# Patient Record
Sex: MALE | Race: WHITE | NOT HISPANIC OR LATINO
[De-identification: names, ages, dates, MRNs, and addresses within clinical notes are randomized per-mention and may not be internally consistent; named-entity substitution may affect disease eponyms.]

---

## 2021-11-22 PROBLEM — Z00.00 ENCOUNTER FOR PREVENTIVE HEALTH EXAMINATION: Status: ACTIVE | Noted: 2021-11-22

## 2021-11-23 ENCOUNTER — APPOINTMENT (OUTPATIENT)
Dept: OTOLARYNGOLOGY | Facility: CLINIC | Age: 71
End: 2021-11-23
Payer: MEDICARE

## 2021-11-23 PROCEDURE — 69210 REMOVE IMPACTED EAR WAX UNI: CPT

## 2021-11-23 PROCEDURE — 99204 OFFICE O/P NEW MOD 45 MIN: CPT

## 2021-11-23 RX ORDER — CIPROFLOXACIN AND DEXAMETHASONE 3; 1 MG/ML; MG/ML
0.3-0.1 SUSPENSION/ DROPS AURICULAR (OTIC) TWICE DAILY
Qty: 1 | Refills: 1 | Status: ACTIVE | COMMUNITY
Start: 2021-11-23 | End: 1900-01-01

## 2021-11-23 NOTE — HISTORY OF PRESENT ILLNESS
[de-identified] : 71M here for initial evaluation.\par \par He c/o left ear pain and diminished hearing for over the past 2 months. Initially, when sx started, there was much more pain and drainage - he went to ENT who placed ear wick and started him on otic drops. The wick fell out and on f/u visit the ENT wanted to replace the wick but pt refused. Since then, his sx have persisted, though the pain has gotten a bit better. He saw another ENT in [place] who told pt he had a very narrow ear canal.\par Sx still persisted and he is here in followup. There is still left ear pain and muffled hearing.\par Known hearing loss on audiogram years ago; was  around loud noise.\par \par ROS otherwise unremarkable.

## 2021-11-23 NOTE — CONSULT LETTER
[Dear  ___] : Dear  [unfilled], [Courtesy Letter:] : I had the pleasure of seeing your patient, [unfilled], in my office today. [Consult Closing:] : Thank you very much for allowing me to participate in the care of this patient.  If you have any questions, please do not hesitate to contact me. [Sincerely,] : Sincerely, [FreeTextEntry3] : Mac Chew MD\par Department of Otolaryngology - Head and Neck Surgery\par [facility]

## 2021-11-23 NOTE — PHYSICAL EXAM
[Midline] : trachea located in midline position [Normal] : no rashes [FreeTextEntry1] : Ad: EAC narrow w cerumen removed w curet. TM intact and mobile, ME clear\par As: EAC swollen completely shut - cannot advance peds speculum to see TM, moderate pain, minimal debris, cx taken and suctioned\par \par wick placed, dexamethasone placed into wick

## 2021-11-23 NOTE — ASSESSMENT
[FreeTextEntry1] : 71M here for initial evaluation. He c/o left ear pain and diminished hearing for over the past 2 months. Initially, when sx started, there was much more pain and drainage - he [place] who placed ear wick and started him on otic drops. The wick fell out and on f/u visit the ENT wanted to replace the wick but pt refused. Since then, his sx have persisted, though the pain has gotten a bit better. He saw another ENT in [place] who told pt he had a very narrow ear canal. He has been using triamcinolone and nystatin ointments to the canal. Sx still persisted and he is here in followup. There is still left ear pain and muffled hearing. He has known hearing loss on audiogram years ago; was  around loud noise.\par On exam, cerumen was cleaned from a narrow right EAC. The left EAC is swollen completely shut; I cannot advance even a peds speculum to see the TM; there is moderate pain and minimal debris, of which cx taken and suctioned. Wick was placed to stent the EAC open and dex applied to wick.\par He has persistent, incompletely treated left otitis externa, chronic at this point, with substantial circumferential thickening of the EAC walls with complete obstruction. I placed a wick in effort to reestablish EAC patency, but unsure if will stay open. To start ciprodex drops as well to wick and will f/u cx in the interim if drops should be switched. He will have wick removed over weekend in ED (it is thanksgiving weekend so office closed Th and Fr) and RTO next week for exam. Continue drops even after wick removal.

## 2021-11-29 LAB — EAR NOSE AND THROAT CULTURE: ABNORMAL

## 2021-12-14 ENCOUNTER — APPOINTMENT (OUTPATIENT)
Dept: OTOLARYNGOLOGY | Facility: CLINIC | Age: 71
End: 2021-12-14
Payer: MEDICARE

## 2021-12-14 DIAGNOSIS — H60.90 UNSPECIFIED OTITIS EXTERNA, UNSPECIFIED EAR: ICD-10-CM

## 2021-12-14 PROCEDURE — 99213 OFFICE O/P EST LOW 20 MIN: CPT

## 2021-12-14 NOTE — PHYSICAL EXAM
[Midline] : trachea located in midline position [Normal] : no rashes [FreeTextEntry1] : Ad: EAC narrow w cerumen, patent. TM intact and mobile, ME clear\par As: EAC walls very thick and completely shut - cannot advance peds speculum to see TM, moderate pain, minimal debris, suctioned, no purulence

## 2021-12-14 NOTE — CONSULT LETTER
[Dear  ___] : Dear  [unfilled], [Courtesy Letter:] : I had the pleasure of seeing your patient, [unfilled], in my office today. [Consult Closing:] : Thank you very much for allowing me to participate in the care of this patient.  If you have any questions, please do not hesitate to contact me. [Sincerely,] : Sincerely, [FreeTextEntry3] : Mac Chew MD\par Department of Otolaryngology - Head and Neck Surgery\par [facility]

## 2021-12-14 NOTE — HISTORY OF PRESENT ILLNESS
[de-identified] : 71M here in followup.\par \par Since last seen 3 weeks ago, he remains symptomatic.\par After prior visit he had the left ear wick removed in an ED but never followed up. Even after wick removal hearing never improved; he has continued with the ciprodex. There is no pain.\par \par Prior note 11/23/21:\par He c/o left ear pain and diminished hearing for over the past 2 months. Initially, when sx started, there was much more pain and drainage - he went to ENT who placed ear wick and started him on otic drops. The wick fell out and on f/u visit the ENT wanted to replace the wick but pt refused. Since then, his sx have persisted, though the pain has gotten a bit better. He saw another ENT in [place] who told pt he had a very narrow ear canal.\par Sx still persisted and he is here in followup. There is still left ear pain and muffled hearing.\par Known hearing loss on audiogram years ago; was  around loud noise.\par \par ROS otherwise unremarkable.

## 2021-12-14 NOTE — ASSESSMENT
[FreeTextEntry1] : 71M here in followup. Since last seen 3 weeks ago, he remains symptomatic. Three days after prior visit he had the left ear wick removed in an ED but never followed up thereafter. Even after wick removal the left sided hearing never improved; he has continued with the ciprodex. There is no pain.\par Prior to initial visit 11/23/21, he c/o left ear pain and diminished hearing for over 2 months. Initially, when sx started, there was much more pain and drainage - [place] who placed ear wick and started him on otic drops. The wick fell out and on f/u visit the ENT wanted to replace the wick but pt refused. Since then, his sx have persisted, though the pain has gotten a bit better. He saw another ENT in [place] who told pt he had a very narrow ear canal. He has been using triamcinolone and nystatin ointments to the canal. Sx still persisted and he came here 11/23/21 and was noted to have completely obstructed left EAC due to thickened and scarred walls; at that time, wick placed as above.\par He has known hearing loss on audiogram years ago; was  around loud noise.\par On exam, the left EAC is swollen completely shut; I cannot advance even a peds speculum to see the TM; there is mild pain and minimal debris, of which was suctioned. \par He has complete stenosis of the left EAC from severe circumferential thickening of the EAC walls with complete obstruction. This is all likely secondary to incompletely treated otitis externa present even prior to his first visit here with me. I will send to one of my otology colleagues for further evaluation and treatment management for this chronic issue.

## 2021-12-29 ENCOUNTER — APPOINTMENT (OUTPATIENT)
Dept: OTOLARYNGOLOGY | Facility: CLINIC | Age: 71
End: 2021-12-29
Payer: MEDICARE

## 2021-12-29 VITALS
OXYGEN SATURATION: 97 % | HEIGHT: 62 IN | SYSTOLIC BLOOD PRESSURE: 134 MMHG | BODY MASS INDEX: 32.2 KG/M2 | HEART RATE: 74 BPM | DIASTOLIC BLOOD PRESSURE: 78 MMHG | WEIGHT: 175 LBS | TEMPERATURE: 98.4 F

## 2021-12-29 DIAGNOSIS — Z78.9 OTHER SPECIFIED HEALTH STATUS: ICD-10-CM

## 2021-12-29 DIAGNOSIS — Z72.89 OTHER PROBLEMS RELATED TO LIFESTYLE: ICD-10-CM

## 2021-12-29 DIAGNOSIS — H93.90 UNSPECIFIED DISORDER OF EAR, UNSPECIFIED EAR: ICD-10-CM

## 2021-12-29 DIAGNOSIS — Z82.3 FAMILY HISTORY OF STROKE: ICD-10-CM

## 2021-12-29 DIAGNOSIS — Z85.46 PERSONAL HISTORY OF MALIGNANT NEOPLASM OF PROSTATE: ICD-10-CM

## 2021-12-29 DIAGNOSIS — Z82.2 FAMILY HISTORY OF DEAFNESS AND HEARING LOSS: ICD-10-CM

## 2021-12-29 PROCEDURE — 99214 OFFICE O/P EST MOD 30 MIN: CPT

## 2021-12-29 PROCEDURE — 92557 COMPREHENSIVE HEARING TEST: CPT

## 2021-12-29 PROCEDURE — 92550 TYMPANOMETRY & REFLEX THRESH: CPT

## 2021-12-29 RX ORDER — SULFAMETHOXAZOLE AND TRIMETHOPRIM 800; 160 MG/1; MG/1
800-160 TABLET ORAL
Qty: 20 | Refills: 0 | Status: ACTIVE | COMMUNITY
Start: 2021-12-29 | End: 1900-01-01

## 2021-12-29 RX ORDER — THIAMINE HCL 100 MG
500 TABLET ORAL
Refills: 0 | Status: ACTIVE | COMMUNITY

## 2021-12-29 NOTE — ASSESSMENT
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 \par rtc with ct - he may be a surgical candidate

## 2021-12-29 NOTE — HISTORY OF PRESENT ILLNESS
[de-identified] : 72 yo M with left progressive hl and feels he has had no l hearing for 4-5 months. He has drainage from the ear, especially when he opens and closes his mouth and dull discomfort. He has tried Otowick and drops approx 4 x and does not want to do this again as they have never helped. He is here with his wife. About to take a flight out of town. 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 Stopped using qtips a few months ago. Not diabetic.

## 2021-12-29 NOTE — PHYSICAL EXAM
[Normal] : no neck adenopathy [de-identified] : b canals stenotic. r tm normal; l canal sealed shut. Attempted to open under microscope with pediatric speculum and nasal speculum without success. drainage cultures and suctioned. tm not visualized. [de-identified] : gait steady

## 2022-01-05 LAB — EAR NOSE AND THROAT CULTURE: NORMAL

## 2022-02-03 ENCOUNTER — APPOINTMENT (OUTPATIENT)
Dept: OTOLARYNGOLOGY | Facility: CLINIC | Age: 72
End: 2022-02-03
Payer: MEDICARE

## 2022-02-03 VITALS — TEMPERATURE: 98.2 F

## 2022-02-03 DIAGNOSIS — H90.A32 MIXED CONDUCTIVE AND SENSORINEURAL HEARING, UNILATERAL, LEFT EAR WITH RESTRICTED HEARING ON THE  CONTRALATERAL SIDE: ICD-10-CM

## 2022-02-03 DIAGNOSIS — H92.02 OTALGIA, LEFT EAR: ICD-10-CM

## 2022-02-03 PROCEDURE — 99214 OFFICE O/P EST MOD 30 MIN: CPT

## 2022-02-03 NOTE — DATA REVIEWED
[de-identified] :  from last visit reviewed with pt and wife [de-identified] : ct l middle ear and mastoid opacification, likely cholesteatoma, eac nearly sealed shut. There appears to be erosion of anterior wall of eac. reviewed with -pt and wife

## 2022-02-03 NOTE — HISTORY OF PRESENT ILLNESS
[de-identified] : followup 70 yo M with 10 yr h/o l hl and drainage now with otalgia for 4-5 months. he has had drops and abx without improvement and 2 negative cultures. Here to review ct done today. here with his wife. He feels his hearing is completely blocked and pain improved after taking abx but still feels fullness. Pain with opening and closing mouth has improved. Denies trauma.

## 2022-02-03 NOTE — PHYSICAL EXAM
[Normal] : assessment of respiratory effort is normal [de-identified] : r nl; l nearly shut, some contiued putulence examined under microscope [FreeTextEntry2] : VII intact [de-identified] : gait steady

## 2022-02-03 NOTE — ASSESSMENT
[FreeTextEntry1] : l infected cholesteatoma vs all infx\par tmj may be involved - asked to see Dr Jacinto Cosby to see if she wishes to participate with surgery\par risks benefits and alts to l modified radical mastoidectomy discussed - will also need medical clearance.

## 2022-03-01 ENCOUNTER — NON-APPOINTMENT (OUTPATIENT)
Age: 72
End: 2022-03-01

## 2022-03-08 ENCOUNTER — NON-APPOINTMENT (OUTPATIENT)
Age: 72
End: 2022-03-08

## 2022-03-13 LAB — SARS-COV-2 N GENE NPH QL NAA+PROBE: NOT DETECTED

## 2022-03-13 RX ORDER — HYDROCODONE BITARTRATE AND ACETAMINOPHEN 5; 325 MG/1; MG/1
5-325 TABLET ORAL
Qty: 15 | Refills: 0 | Status: ACTIVE | COMMUNITY
Start: 2022-03-13 | End: 1900-01-01

## 2022-03-13 RX ORDER — CEFUROXIME AXETIL 500 MG/1
500 TABLET ORAL
Qty: 14 | Refills: 0 | Status: ACTIVE | COMMUNITY
Start: 2022-03-13 | End: 1900-01-01

## 2022-03-13 RX ORDER — DOCUSATE SODIUM 100 MG/1
100 CAPSULE, LIQUID FILLED ORAL TWICE DAILY
Qty: 42 | Refills: 0 | Status: ACTIVE | COMMUNITY
Start: 2022-03-13 | End: 1900-01-01

## 2022-03-15 ENCOUNTER — NON-APPOINTMENT (OUTPATIENT)
Age: 72
End: 2022-03-15

## 2022-03-18 ENCOUNTER — NON-APPOINTMENT (OUTPATIENT)
Age: 72
End: 2022-03-18

## 2022-03-23 ENCOUNTER — OUTPATIENT (OUTPATIENT)
Dept: OUTPATIENT SERVICES | Facility: HOSPITAL | Age: 72
LOS: 1 days | End: 2022-03-23

## 2022-03-23 ENCOUNTER — APPOINTMENT (OUTPATIENT)
Dept: RADIOLOGY | Facility: CLINIC | Age: 72
End: 2022-03-23
Payer: MEDICARE

## 2022-03-23 DIAGNOSIS — Z98.890 OTHER SPECIFIED POSTPROCEDURAL STATES: Chronic | ICD-10-CM

## 2022-03-23 DIAGNOSIS — Z90.89 ACQUIRED ABSENCE OF OTHER ORGANS: Chronic | ICD-10-CM

## 2022-03-23 DIAGNOSIS — Z87.19 PERSONAL HISTORY OF OTHER DISEASES OF THE DIGESTIVE SYSTEM: Chronic | ICD-10-CM

## 2022-03-23 PROBLEM — C61 MALIGNANT NEOPLASM OF PROSTATE: Chronic | Status: ACTIVE | Noted: 2022-03-15

## 2022-03-23 PROBLEM — I10 ESSENTIAL (PRIMARY) HYPERTENSION: Chronic | Status: ACTIVE | Noted: 2022-03-15

## 2022-03-23 PROCEDURE — 72100 X-RAY EXAM L-S SPINE 2/3 VWS: CPT | Mod: 26

## 2022-03-28 ENCOUNTER — NON-APPOINTMENT (OUTPATIENT)
Age: 72
End: 2022-03-28

## 2022-04-05 ENCOUNTER — NON-APPOINTMENT (OUTPATIENT)
Age: 72
End: 2022-04-05

## 2022-04-06 ENCOUNTER — NON-APPOINTMENT (OUTPATIENT)
Age: 72
End: 2022-04-06

## 2022-04-12 ENCOUNTER — NON-APPOINTMENT (OUTPATIENT)
Age: 72
End: 2022-04-12

## 2022-04-14 ENCOUNTER — NON-APPOINTMENT (OUTPATIENT)
Age: 72
End: 2022-04-14

## 2022-04-15 ENCOUNTER — NON-APPOINTMENT (OUTPATIENT)
Age: 72
End: 2022-04-15

## 2022-04-18 ENCOUNTER — TRANSCRIPTION ENCOUNTER (OUTPATIENT)
Age: 72
End: 2022-04-18

## 2022-04-18 ENCOUNTER — APPOINTMENT (OUTPATIENT)
Dept: OTOLARYNGOLOGY | Facility: CLINIC | Age: 72
End: 2022-04-18
Payer: MEDICARE

## 2022-04-18 ENCOUNTER — NON-APPOINTMENT (OUTPATIENT)
Age: 72
End: 2022-04-18

## 2022-04-18 VITALS
DIASTOLIC BLOOD PRESSURE: 83 MMHG | HEIGHT: 63 IN | WEIGHT: 175 LBS | BODY MASS INDEX: 31.01 KG/M2 | SYSTOLIC BLOOD PRESSURE: 159 MMHG | OXYGEN SATURATION: 99 % | TEMPERATURE: 98 F | HEART RATE: 83 BPM

## 2022-04-18 DIAGNOSIS — H70.12 CHRONIC MASTOIDITIS, LEFT EAR: ICD-10-CM

## 2022-04-18 LAB — SARS-COV-2 N GENE NPH QL NAA+PROBE: NOT DETECTED

## 2022-04-18 PROCEDURE — 99214 OFFICE O/P EST MOD 30 MIN: CPT

## 2022-04-18 NOTE — HISTORY OF PRESENT ILLNESS
[de-identified] : 2.5 month followup for this 70 y/o M here with wife with 10 yr h/o L hl and drainage. He is scheduled for L tympanoplasty with modified radical mastoidectomy tomorrow on 4/19. He denies pain or drainage. He had noted his sx improved with no drainage at last visit and I wished to recheck his ear.

## 2022-04-18 NOTE — ASU PATIENT PROFILE, ADULT - NSICDXPASTMEDICALHX_GEN_ALL_CORE_FT
PAST MEDICAL HISTORY:  Hypertension     Prostate cancer      PAST MEDICAL HISTORY:  H/O mastoiditis     Hypertension     Prostate cancer

## 2022-04-18 NOTE — BRIEF OPERATIVE NOTE - NSICDXBRIEFPOSTOP_GEN_ALL_CORE_FT
POST-OP DIAGNOSIS:  Cholesteatoma, left 18-Apr-2022 10:05:39  Odilon Steele  Chronic left mastoiditis 18-Apr-2022 10:05:48  Odilon Steele  Mixed conductive and sensorineural hearing loss of left ear with restricted hearing of right ear 18-Apr-2022 10:06:01  Odilon Steele   POST-OP DIAGNOSIS:  Chronic left mastoiditis 18-Apr-2022 10:05:48  Odilon Steele  Fistula of left mastoid 19-Apr-2022 15:32:43  Odilon Steele  Conductive hearing loss in left ear 19-Apr-2022 15:33:08  Odilon Steele   POST-OP DIAGNOSIS:  Chronic left mastoiditis 18-Apr-2022 10:05:48  Odilon Steele  Fistula of left mastoid 19-Apr-2022 15:32:43  Odilon Steele  Conductive hearing loss in left ear 19-Apr-2022 15:33:08  Odilon Steele  Stenosis of external ear canal 19-Apr-2022 16:50:23  Odilon Steele

## 2022-04-18 NOTE — BRIEF OPERATIVE NOTE - NSICDXBRIEFPREOP_GEN_ALL_CORE_FT
PRE-OP DIAGNOSIS:  Cholesteatoma, left 18-Apr-2022 10:04:51  Odilon Steele  Chronic left mastoiditis 18-Apr-2022 10:05:03  Odilon Steele  Mixed conductive and sensorineural hearing loss of left ear with restricted hearing of right ear 18-Apr-2022 10:05:27  Odilon Steele   PRE-OP DIAGNOSIS:  Chronic left mastoiditis 18-Apr-2022 10:05:03  Odilon Steele  Fistula of left mastoid 19-Apr-2022 15:30:54  Odilon Steele  Conductive hearing loss in left ear 19-Apr-2022 15:31:14  Odilon Steele   PRE-OP DIAGNOSIS:  Chronic left mastoiditis 18-Apr-2022 10:05:03  Odilon Steele  Fistula of left mastoid 19-Apr-2022 15:30:54  Odilon Steele  Conductive hearing loss in left ear 19-Apr-2022 15:31:14  Odilon Steele  Stenosis of external ear canal 19-Apr-2022 16:49:47  Odilon Steele

## 2022-04-18 NOTE — BRIEF OPERATIVE NOTE - NSICDXBRIEFPROCEDURE_GEN_ALL_CORE_FT
PROCEDURES:  Left tympanoplasty with mastoidectomy 18-Apr-2022 10:04:33  Odilon Steele   PROCEDURES:  Modified radical mastoidectomy 19-Apr-2022 15:28:01  Odilon Steele  Meatoplasty, external auditory canal 19-Apr-2022 15:29:24  Odilon Steele

## 2022-04-18 NOTE — DATA REVIEWED
[de-identified] :  today severe l chl but improved over last visit 20 dB in lf's [de-identified] : ct reviewed with pt - l mastoid fistula into superior eac, opacified mastoid and ME, tmj posterior wall possible erosion, stenotic eac

## 2022-04-18 NOTE — ASU DISCHARGE PLAN (ADULT/PEDIATRIC) - CARE PROVIDER_API CALL
Vishal Villaseñor)  Otolaryngology  [address], [address]  [place], [place] [address]  Phone: [phone]  Fax: [phone]  Follow Up Time:

## 2022-04-18 NOTE — ASU DISCHARGE PLAN (ADULT/PEDIATRIC) - PROCEDURE
Left tympanoplasty with modified radical mastoidectomy Left modified radical mastoidectomy with meatoplasty Left modified radical mastoidectomy and Left meatoplasty

## 2022-04-18 NOTE — ASSESSMENT
[FreeTextEntry1] : L cholesteatoma with stenosed eac\par -no evidence of drainage\par -cerumen/ debris suctioned atraumatically\par -ear felt better\par -scheduled for L modified radical mastoidectomy tomorrow - ct reviewed with patient and his wife. Does not appear currently infected but has mastoid-eac fistula and chl\par I discussed technique, risks, benefits and alts to surgery with pt and his wife\par they wished to proceed\par consent signed\par he said she still has his postop meds from when surgery was scheduled last

## 2022-04-18 NOTE — BRIEF OPERATIVE NOTE - OPERATION/FINDINGS
Left mastoid Left mastoid filled with granulation tissue, middle ear filled with granulation tissue, two fistulae from external auditory canal into mastoid and ossicular chain intact

## 2022-04-18 NOTE — ASU PATIENT PROFILE, ADULT - NSICDXPASTSURGICALHX_GEN_ALL_CORE_FT
PAST SURGICAL HISTORY:  History of prostate biopsy     History of prostate surgery Transperineal       Prostate Cryoablation    History of rectal abscess     Status post tonsillectomy     Status post tonsillectomy

## 2022-04-18 NOTE — PHYSICAL EXAM
[Normal] : external ears are normal bilaterally [de-identified] : R ok, L stenotic eac, copious cerumen/ debris removed atraumatically with suction under microscope, TMs intact (as much of l which could be seen) [de-identified] : gait steady

## 2022-04-18 NOTE — ASU PATIENT PROFILE, ADULT - FALL HARM RISK - UNIVERSAL INTERVENTIONS
Bed in lowest position, wheels locked, appropriate side rails in place/Call bell, personal items and telephone in reach/Instruct patient to call for assistance before getting out of bed or chair/Non-slip footwear when patient is out of bed/[place] to call system/Physically safe environment - no spills, clutter or unnecessary equipment/Purposeful Proactive Rounding/Room/bathroom lighting operational, light cord in reach

## 2022-04-18 NOTE — PROCEDURE
[Cerumen Impaction] : Cerumen Impaction [Same] : same as the Pre Op Dx. [] : Removal of Cerumen [FreeTextEntry5] : R nl, L stenotic eac, copious cerumen/debris removed atraumatically under microscope, TMs intact

## 2022-04-19 ENCOUNTER — RESULT REVIEW (OUTPATIENT)
Age: 72
End: 2022-04-19

## 2022-04-19 ENCOUNTER — EMERGENCY (EMERGENCY)
Facility: HOSPITAL | Age: 72
LOS: 1 days | Discharge: HOME | End: 2022-04-19
Attending: EMERGENCY MEDICINE | Admitting: EMERGENCY MEDICINE
Payer: MEDICARE

## 2022-04-19 ENCOUNTER — NON-APPOINTMENT (OUTPATIENT)
Age: 72
End: 2022-04-19

## 2022-04-19 ENCOUNTER — APPOINTMENT (OUTPATIENT)
Dept: OTOLARYNGOLOGY | Facility: AMBULATORY SURGERY CENTER | Age: 72
End: 2022-04-19

## 2022-04-19 ENCOUNTER — OUTPATIENT (OUTPATIENT)
Dept: OUTPATIENT SERVICES | Facility: HOSPITAL | Age: 72
LOS: 1 days | Discharge: HOME | End: 2022-04-19
Payer: MEDICARE

## 2022-04-19 VITALS
HEIGHT: 63 IN | OXYGEN SATURATION: 96 % | TEMPERATURE: 98 F | HEART RATE: 93 BPM | DIASTOLIC BLOOD PRESSURE: 67 MMHG | RESPIRATION RATE: 18 BRPM | SYSTOLIC BLOOD PRESSURE: 112 MMHG

## 2022-04-19 VITALS
SYSTOLIC BLOOD PRESSURE: 140 MMHG | DIASTOLIC BLOOD PRESSURE: 71 MMHG | OXYGEN SATURATION: 94 % | TEMPERATURE: 97 F | HEART RATE: 99 BPM

## 2022-04-19 VITALS
WEIGHT: 187.39 LBS | DIASTOLIC BLOOD PRESSURE: 64 MMHG | TEMPERATURE: 99 F | HEART RATE: 72 BPM | OXYGEN SATURATION: 97 % | SYSTOLIC BLOOD PRESSURE: 137 MMHG | RESPIRATION RATE: 16 BRPM | HEIGHT: 63 IN

## 2022-04-19 DIAGNOSIS — Z88.8 ALLERGY STATUS TO OTHER DRUGS, MEDICAMENTS AND BIOLOGICAL SUBSTANCES: ICD-10-CM

## 2022-04-19 DIAGNOSIS — Z90.89 ACQUIRED ABSENCE OF OTHER ORGANS: Chronic | ICD-10-CM

## 2022-04-19 DIAGNOSIS — Z98.890 OTHER SPECIFIED POSTPROCEDURAL STATES: Chronic | ICD-10-CM

## 2022-04-19 DIAGNOSIS — Z85.46 PERSONAL HISTORY OF MALIGNANT NEOPLASM OF PROSTATE: ICD-10-CM

## 2022-04-19 DIAGNOSIS — Z86.69 PERSONAL HISTORY OF OTHER DISEASES OF THE NERVOUS SYSTEM AND SENSE ORGANS: ICD-10-CM

## 2022-04-19 DIAGNOSIS — M25.561 PAIN IN RIGHT KNEE: ICD-10-CM

## 2022-04-19 DIAGNOSIS — Z87.19 PERSONAL HISTORY OF OTHER DISEASES OF THE DIGESTIVE SYSTEM: Chronic | ICD-10-CM

## 2022-04-19 DIAGNOSIS — M54.9 DORSALGIA, UNSPECIFIED: ICD-10-CM

## 2022-04-19 DIAGNOSIS — M79.604 PAIN IN RIGHT LEG: ICD-10-CM

## 2022-04-19 DIAGNOSIS — R25.1 TREMOR, UNSPECIFIED: ICD-10-CM

## 2022-04-19 DIAGNOSIS — I10 ESSENTIAL (PRIMARY) HYPERTENSION: ICD-10-CM

## 2022-04-19 DIAGNOSIS — Z90.89 ACQUIRED ABSENCE OF OTHER ORGANS: ICD-10-CM

## 2022-04-19 DIAGNOSIS — R53.1 WEAKNESS: ICD-10-CM

## 2022-04-19 LAB
GLUCOSE BLDC GLUCOMTR-MCNC: 146 MG/DL — HIGH (ref 70–99)
GRAM STN FLD: SIGNIFICANT CHANGE UP
SPECIMEN SOURCE: SIGNIFICANT CHANGE UP

## 2022-04-19 PROCEDURE — 99284 EMERGENCY DEPT VISIT MOD MDM: CPT

## 2022-04-19 PROCEDURE — 69310 REBUILD OUTER EAR CANAL: CPT | Mod: LT,59

## 2022-04-19 PROCEDURE — 88305 TISSUE EXAM BY PATHOLOGIST: CPT | Mod: 26

## 2022-04-19 PROCEDURE — 69645 REVISE MIDDLE EAR & MASTOID: CPT | Mod: LT

## 2022-04-19 DEVICE — SURGIFOAM PAD 2CM X 6CM X 7MM (12-7): Type: IMPLANTABLE DEVICE | Status: FUNCTIONAL

## 2022-04-19 RX ORDER — ACETAMINOPHEN 500 MG
975 TABLET ORAL ONCE
Refills: 0 | Status: COMPLETED | OUTPATIENT
Start: 2022-04-19 | End: 2022-04-19

## 2022-04-19 RX ORDER — CEFUROXIME AXETIL 250 MG
1 TABLET ORAL
Qty: 0 | Refills: 0 | DISCHARGE

## 2022-04-19 RX ORDER — SODIUM CHLORIDE 9 MG/ML
1000 INJECTION, SOLUTION INTRAVENOUS ONCE
Refills: 0 | Status: COMPLETED | OUTPATIENT
Start: 2022-04-19 | End: 2022-04-19

## 2022-04-19 RX ORDER — DOCUSATE SODIUM 100 MG
1 CAPSULE ORAL
Qty: 0 | Refills: 0 | DISCHARGE

## 2022-04-19 RX ORDER — SODIUM CHLORIDE 9 MG/ML
1000 INJECTION, SOLUTION INTRAVENOUS
Refills: 0 | Status: DISCONTINUED | OUTPATIENT
Start: 2022-04-19 | End: 2022-04-19

## 2022-04-19 RX ADMIN — SODIUM CHLORIDE 100 MILLILITER(S): 9 INJECTION, SOLUTION INTRAVENOUS at 20:28

## 2022-04-19 RX ADMIN — SODIUM CHLORIDE 1000 MILLILITER(S): 9 INJECTION, SOLUTION INTRAVENOUS at 20:28

## 2022-04-19 RX ADMIN — Medication 975 MILLIGRAM(S): at 19:48

## 2022-04-19 NOTE — ED ADULT NURSE NOTE - OBJECTIVE STATEMENT
pt s/p mastoidectomy this morning, was unable to ambulate after the procedure. pt was transferred from [facility]. mild bleeding noted to the L ear. pt c/o pain to the R knee, and unable to open his L eye. Gauze dressing around the incision site on the face. denies any complications from sx besides being unable to ambulate. AOx4, able to speak in full sentences and can follow commands, decreased hearing noted on the L side.

## 2022-04-19 NOTE — ED ADULT NURSE NOTE - NSIMPLEMENTINTERV_GEN_ALL_ED
Implemented All Universal Safety Interventions:  [place] to call system. Call bell, personal items and telephone within reach. Instruct patient to call for assistance. Room bathroom lighting operational. Non-slip footwear when patient is off stretcher. Physically safe environment: no spills, clutter or unnecessary equipment. Stretcher in lowest position, wheels locked, appropriate side rails in place.

## 2022-04-20 ENCOUNTER — NON-APPOINTMENT (OUTPATIENT)
Age: 72
End: 2022-04-20

## 2022-04-20 VITALS
HEART RATE: 93 BPM | OXYGEN SATURATION: 98 % | SYSTOLIC BLOOD PRESSURE: 142 MMHG | DIASTOLIC BLOOD PRESSURE: 67 MMHG | RESPIRATION RATE: 16 BRPM | TEMPERATURE: 98 F

## 2022-04-20 PROBLEM — Z86.69 PERSONAL HISTORY OF OTHER DISEASES OF THE NERVOUS SYSTEM AND SENSE ORGANS: Chronic | Status: ACTIVE | Noted: 2022-04-19

## 2022-04-20 LAB
ALBUMIN SERPL ELPH-MCNC: 4.1 G/DL — SIGNIFICANT CHANGE UP (ref 3.3–5)
ALP SERPL-CCNC: 58 U/L — SIGNIFICANT CHANGE UP (ref 40–120)
ALT FLD-CCNC: 16 U/L — SIGNIFICANT CHANGE UP (ref 10–45)
ANION GAP SERPL CALC-SCNC: 13 MMOL/L — SIGNIFICANT CHANGE UP (ref 5–17)
APTT BLD: 28 SEC — SIGNIFICANT CHANGE UP (ref 27.5–35.5)
AST SERPL-CCNC: 20 U/L — SIGNIFICANT CHANGE UP (ref 10–40)
BASOPHILS # BLD AUTO: 0 K/UL — SIGNIFICANT CHANGE UP (ref 0–0.2)
BASOPHILS NFR BLD AUTO: 0 % — SIGNIFICANT CHANGE UP (ref 0–2)
BILIRUB SERPL-MCNC: 0.3 MG/DL — SIGNIFICANT CHANGE UP (ref 0.2–1.2)
BUN SERPL-MCNC: 14 MG/DL — SIGNIFICANT CHANGE UP (ref 7–23)
CALCIUM SERPL-MCNC: 8.8 MG/DL — SIGNIFICANT CHANGE UP (ref 8.4–10.5)
CHLORIDE SERPL-SCNC: 104 MMOL/L — SIGNIFICANT CHANGE UP (ref 96–108)
CO2 SERPL-SCNC: 21 MMOL/L — LOW (ref 22–31)
CREAT SERPL-MCNC: 0.86 MG/DL — SIGNIFICANT CHANGE UP (ref 0.5–1.3)
EGFR: 91 ML/MIN/1.73M2 — SIGNIFICANT CHANGE UP
EOSINOPHIL # BLD AUTO: 0 K/UL — SIGNIFICANT CHANGE UP (ref 0–0.5)
EOSINOPHIL NFR BLD AUTO: 0 % — SIGNIFICANT CHANGE UP (ref 0–6)
GLUCOSE SERPL-MCNC: 159 MG/DL — HIGH (ref 70–99)
HCT VFR BLD CALC: 41.9 % — SIGNIFICANT CHANGE UP (ref 39–50)
HGB BLD-MCNC: 14.4 G/DL — SIGNIFICANT CHANGE UP (ref 13–17)
IMM GRANULOCYTES NFR BLD AUTO: 0.4 % — SIGNIFICANT CHANGE UP (ref 0–1.5)
INR BLD: 1.03 — SIGNIFICANT CHANGE UP (ref 0.88–1.16)
LYMPHOCYTES # BLD AUTO: 0.85 K/UL — LOW (ref 1–3.3)
LYMPHOCYTES # BLD AUTO: 8.5 % — LOW (ref 13–44)
MCHC RBC-ENTMCNC: 33.4 PG — SIGNIFICANT CHANGE UP (ref 27–34)
MCHC RBC-ENTMCNC: 34.4 GM/DL — SIGNIFICANT CHANGE UP (ref 32–36)
MCV RBC AUTO: 97.2 FL — SIGNIFICANT CHANGE UP (ref 80–100)
MONOCYTES # BLD AUTO: 0.27 K/UL — SIGNIFICANT CHANGE UP (ref 0–0.9)
MONOCYTES NFR BLD AUTO: 2.7 % — SIGNIFICANT CHANGE UP (ref 2–14)
NEUTROPHILS # BLD AUTO: 8.81 K/UL — HIGH (ref 1.8–7.4)
NEUTROPHILS NFR BLD AUTO: 88.4 % — HIGH (ref 43–77)
NRBC # BLD: 0 /100 WBCS — SIGNIFICANT CHANGE UP (ref 0–0)
PLATELET # BLD AUTO: 199 K/UL — SIGNIFICANT CHANGE UP (ref 150–400)
POTASSIUM SERPL-MCNC: 4.1 MMOL/L — SIGNIFICANT CHANGE UP (ref 3.5–5.3)
POTASSIUM SERPL-SCNC: 4.1 MMOL/L — SIGNIFICANT CHANGE UP (ref 3.5–5.3)
PROT SERPL-MCNC: 6.7 G/DL — SIGNIFICANT CHANGE UP (ref 6–8.3)
PROTHROM AB SERPL-ACNC: 12.3 SEC — SIGNIFICANT CHANGE UP (ref 10.5–13.4)
RBC # BLD: 4.31 M/UL — SIGNIFICANT CHANGE UP (ref 4.2–5.8)
RBC # FLD: 13.6 % — SIGNIFICANT CHANGE UP (ref 10.3–14.5)
SODIUM SERPL-SCNC: 138 MMOL/L — SIGNIFICANT CHANGE UP (ref 135–145)
WBC # BLD: 9.97 K/UL — SIGNIFICANT CHANGE UP (ref 3.8–10.5)
WBC # FLD AUTO: 9.97 K/UL — SIGNIFICANT CHANGE UP (ref 3.8–10.5)

## 2022-04-20 PROCEDURE — 85025 COMPLETE CBC W/AUTO DIFF WBC: CPT

## 2022-04-20 PROCEDURE — 85730 THROMBOPLASTIN TIME PARTIAL: CPT

## 2022-04-20 PROCEDURE — 85610 PROTHROMBIN TIME: CPT

## 2022-04-20 PROCEDURE — 93971 EXTREMITY STUDY: CPT

## 2022-04-20 PROCEDURE — 93971 EXTREMITY STUDY: CPT | Mod: 26,RT

## 2022-04-20 PROCEDURE — 96374 THER/PROPH/DIAG INJ IV PUSH: CPT

## 2022-04-20 PROCEDURE — 80053 COMPREHEN METABOLIC PANEL: CPT

## 2022-04-20 PROCEDURE — 36415 COLL VENOUS BLD VENIPUNCTURE: CPT

## 2022-04-20 PROCEDURE — 99284 EMERGENCY DEPT VISIT MOD MDM: CPT | Mod: 25

## 2022-04-20 RX ORDER — ACETAMINOPHEN 500 MG
1000 TABLET ORAL ONCE
Refills: 0 | Status: COMPLETED | OUTPATIENT
Start: 2022-04-20 | End: 2022-04-20

## 2022-04-20 RX ORDER — OXYCODONE AND ACETAMINOPHEN 5; 325 MG/1; MG/1
1 TABLET ORAL ONCE
Refills: 0 | Status: DISCONTINUED | OUTPATIENT
Start: 2022-04-20 | End: 2022-04-20

## 2022-04-20 RX ADMIN — Medication 400 MILLIGRAM(S): at 01:04

## 2022-04-20 RX ADMIN — OXYCODONE AND ACETAMINOPHEN 1 TABLET(S): 5; 325 TABLET ORAL at 03:04

## 2022-04-20 NOTE — ED PROVIDER NOTE - CLINICAL SUMMARY MEDICAL DECISION MAKING FREE TEXT BOX
72 y/o M pt presents to ED with R knee and leg pain s/p surgery today. Plan for labs, LE dopplers, lumbar XR done, pt given IV Tylenol, and reassess. 70 y/o M pt presents to ED with R knee and leg pain s/p surgery today. Plan for labs, LE dopplers, lumbar XR done, pt given IV Tylenol, and reassess.  ENT to evaluate pt.

## 2022-04-20 NOTE — PROGRESS NOTE ADULT - ASSESSMENT
Assessment and Plan:  SANDRA AYALA is a 71M who is presenting from [facility] after outpatient surgery for chronic ear infection in which he underwent a canal wall down mastoidectomy and meatoplasty on the LEFT side. Patient in the postoperative period had difficulty walking because of R knee pain and R leg weakness. Per wife who was bedside, patient needed 3 people to stand him up at bedside because of the leg pain/weakness. Since transport and arrival to the ED, patient said he feels 70% better with pain and weakness. He said the R leg pain has mostly subsided and he feels much stronger in the leg as well. Doppler negative. At bedside, patient demonstrated improvement in ambulation, he was able to stand and walk around with out assistance. Patient was seen and examined with Dr. Villaseñor.     Plan:   - Patient demonstrated the ability to walk independently, cleared for discharge from ENT stand-point  - If patient is able to confidently stand, he can shower with a cotton  ball in his ear  - Change cotton ball in left ear as needed  - Patient can remove head wrap tomorrow   - Recommend to wear masks that tie ( do not wrap looped masks around ear)   - recommend for patient to follow up outpatient with his Neurologist   - Patient will follow up with Dr. Villaseñor next Monday at 2pm and tomorrow if needed.   - No NSAIDS for pain admitted    [facility] at [phone] with any questions/concerns.    Aileen Gastelum PA-C  04-20-22 @ 08:16   Assessment and Plan:  SANDRA AYALA is a 71M who is presenting from [facility] after outpatient surgery for chronic ear infection in which he underwent a canal wall down mastoidectomy and meatoplasty on the LEFT side. Patient in the postoperative period had difficulty walking because of R knee pain and R leg weakness. Per wife who was bedside, patient needed 3 people to stand him up at bedside because of the leg pain/weakness. Since transport and arrival to the ED, patient said he feels 70% better with pain and weakness. He said the R leg pain has mostly subsided and he feels much stronger in the leg as well. Doppler negative. At bedside, patient demonstrated improvement in ambulation, he was able to stand and walk around with out assistance. Patient was seen and examined with Dr. Villaseñor. Patient had pulled off his mastoid dressing. There was no hematoma and minimal drainage. Wound was cleaned and light wrap dressing was placed. Patient was given wound care instructions.    Plan:   - Patient demonstrated the ability to walk independently, cleared for discharge from ENT stand-point  - If patient is able to confidently stand, he can shower with a cotton  ball in his ear  - Change cotton ball in left ear as needed  - Patient can remove head wrap tomorrow   - Recommend to wear masks that tie ( do not wrap looped masks around ear)   - recommend for patient to follow up outpatient with his Neurologist   - Patient will follow up with Dr. Villaseñor next Monday at 2pm and tomorrow if needed.   - No NSAIDS for pain admitted    [facility] at [phone] with any questions/concerns.    Aileen Gastelum PA-C  04-20-22 @ 08:16

## 2022-04-20 NOTE — ED ADULT NURSE REASSESSMENT NOTE - NS ED NURSE REASSESS COMMENT FT1
pt ambulated with assistance from MD Womack. denies any pain or dizziness while standing up.
pt ambulating with a steady gait with ENT attenting, and ENT resident. dressing re-applied to head.
report received from nightshift MICHAEL Houston. PT resting comfortably in stretcher. pending wife to pick him up. discharge paperwork provided.
pt pending pickup by wife in the morning, no distress noted at this time,

## 2022-04-20 NOTE — PROGRESS NOTE ADULT - SUBJECTIVE AND OBJECTIVE BOX
OTOLARYNGOLOGY (ENT) PROGRESS NOTE    PATIENT: SANDRA AYALA  MRN: [id number]  : 46  [id number]  DATE OF SERVICE:  22  			         HPI: 71M who is presenting from [facility] after outpatient surgery for chronic ear infection in which he underwent a canal wall down mastoidectomy and meatoplasty on the LEFT side. Patient in the postoperative period had difficulty walking because of R knee pain and R leg weakness. Per wife who was bedside, patient needed 3 people to stand him up at bedside because of the leg pain/weakness. Since transport and arrival to the ED, patient said he feels 70% better with pain and weakness. He said the R leg pain has mostly subsided and he feels much stronger in the leg as well. At bedside, was able to stand with only one assist which was a major improvement from before. Otherwise felt slightly lightheaded so did not attempt to walk him at this time. Denies weakness in other parts of the body, sensory changes or any other major complaints. Surgical site pain is in control.      Subjective/ Interval:   4/10: Patient was seen this morning with Dr. Villaseñor, dressing changed, we got te patient up and walking independently.  ALLERGIES:  Prostate Med (Nausea)      MEDICATIONS:    Vital Signs Last 24 Hrs  T(C): 36.9 (2022 06:41), Max: 37.4 (2022 09:18)  T(F): 98.5 (2022 06:41), Max: 99.3 (2022 09:18)  HR: 85 (2022 06:41) (72 - 105)  BP: 129/64 (2022 06:41) (112/67 - 153/73)  BP(mean): --  RR: 18 (2022 06:41) (15 - 19)  SpO2: 98% (2022 06:41) (93% - 99%)      PHYSICAL EXAM:  CONSTITUTIONAL: well developed, and in no acute distress.    HEAD: circumferential mastoid dressing,  CN intact. Mild weakness on R leg extension, but otherwise neuro exam intact.  EARS: L mastoid dressing in place with minimal serosang drainage.   ORAL CAVITY/OROPHARYNX: normal mucosa. No erythema, lesions or bleeding.  RESPIRATORY: Respirations unlabored, no increased work of breathing with use of accessory muscles and retractions. No stridor.  CARDIAC: Warm extremities, no cyanosis.        LABS                       14.4   9.97  )-----------( 199      ( 2022 01:01 )             41.9        138  |  104  |  14  ----------------------------<  159<H>  4.1   |  21<L>  |  0.86    Ca    8.8      2022 01:01    TPro  6.7  /  Alb  4.1  /  TBili  0.3  /  DBili  x   /  AST  20  /  ALT  16  /  AlkPhos  58           Coagulation Studies-   PT/INR - ( 2022 01:01 )   PT: 12.3 sec;   INR: 1.03          PTT - ( 2022 01:01 )  PTT:28.0 sec    Endocrine Panel-  Calcium, Total Serum: 8.8 mg/dL ( @ 01:01)        MICROBIOLOGY:  Culture - Surgical Swab (collected 22 @ 21:06)  Source: .Surgical Swab left mastoid  Gram Stain (22 @ 21:38):    No organisms seen    No WBC's seen.      Culture - Surgical Swab (collected 22 @ 21:06)  Source: .Surgical Swab left mastoid  Gram Stain (22 @ 21:38):    No organisms seen    No WBC's seen.       RADIOLOGY & ADDITIONAL STUDIES:   PROCEDURE DATE:  2022          INTERPRETATION:  CLINICAL INFORMATION: Right lower extremity pain.    COMPARISON: None available.    TECHNIQUE: Duplex sonography of theRIGHT LOWER extremity veins with   color and spectral Doppler, with and without compression.    FINDINGS:    There is normal compressibility of the right common femoral, femoral and   popliteal veins.  The contralateral common femoral vein is patent.  Doppler examination shows normal spontaneous and phasic flow.    No calf vein thrombosis is detected.    IMPRESSION:  No evidence of right lower extremity deep venous thrombosis.

## 2022-04-20 NOTE — CONSULT NOTE ADULT - SUBJECTIVE AND OBJECTIVE BOX
ENT CONSULT NOTE    HPI: 71M who is presenting from [facility] after outpatient surgery for chronic ear infection in which he underwent a canal wall down mastoidectomy and meatoplasty on the RIGHT side. Patient in the postoperative period had difficulty walking because of R knee pain and R leg weakness. Per wife who was bedside, patient needed 3 people to stand him up at bedside because of the leg pain/weakness. Since transport and arrival to the ED, patient said he feels 70% better with pain and weakness. He said the R leg pain has mostly subsided and he feels much stronger in the leg as well. At bedside, was able to stand with only one assist which was a major improvement from before. Otherwise felt slightly lightheaded so did not attempt to walk him at this time. Denies weakness in other parts of the body, sensory changes or any other major complaints. Surgical site pain is in control.    PAST MEDICAL & SURGICAL HISTORY:  Hypertension    Prostate cancer    H/O mastoiditis    History of prostate biopsy    History of prostate surgery  Transperineal       Prostate Cryoablation    Status post tonsillectomy    Status post tonsillectomy    History of rectal abscess      Prostate Med (Nausea)    MEDICATIONS  (STANDING):  acetaminophen   IVPB .. 1000 milliGRAM(s) IV Intermittent once    MEDICATIONS  (PRN):      Objective    ICU Vital Signs Last 24 Hrs  T(C): 36.9 (19 Apr 2022 23:37), Max: 37.4 (19 Apr 2022 09:18)  T(F): 98.5 (19 Apr 2022 23:37), Max: 99.3 (19 Apr 2022 09:18)  HR: 93 (19 Apr 2022 23:37) (72 - 99)  BP: 112/67 (19 Apr 2022 23:37) (112/67 - 146/67)  BP(mean): --  ABP: --  ABP(mean): --  RR: 18 (19 Apr 2022 23:37) (15 - 19)  SpO2: 96% (19 Apr 2022 23:37) (93% - 99%)      PHYSICAL EXAM:    CONSTITUTIONAL: Well nourished, well developed, NON-DYSMORPHIC, and in no acute distress.    HEAD: normocephalic, atraumatic. CN intact. Mild weakness on R leg extension, but otherwise neuro exam intact.  EARS: L mastoid dressing in place with minimal serosang drainage.   ORAL CAVITY/OROPHARYNX: normal mucosa. No erythema, lesions or bleeding.  RESPIRATORY: Respirations unlabored, no increased work of breathing with use of accessory muscles and retractions. No stridor.  CARDIAC: Warm extremities, no cyanosis.       A/P: 71M who is presenting from [facility] after outpatient surgery for chronic ear infection in which he underwent a canal wall down mastoidectomy and meatoplasty on the RIGHT side. Patient in the postoperative period had difficulty walking because of R knee pain and R leg weakness. Per wife who was bedside, patient needed 3 people to stand him up at bedside because of the leg pain/weakness. Since transport and arrival to the ED, patient said he feels 70% better with pain and weakness. He said the R leg pain has mostly subsided and he feels much stronger in the leg as well. At bedside, was able to stand with only one assist which was a major improvement from before. Otherwise felt slightly lightheaded so did not attempt to walk him at this time.    - F/u LE doppler and spine XR per ED  - If normal and able to walk, can discharge per ED  - If necessary to admit because he is unable to ambulate, ENT will continue following  - No NSAIDS for pain   - IV ancef while in-house if admitted ENT CONSULT NOTE    HPI: 71M who is presenting from [facility] after outpatient surgery for chronic ear infection in which he underwent a canal wall down mastoidectomy and meatoplasty on the RIGHT side. Patient in the postoperative period had difficulty walking because of R knee pain and R leg weakness. Per wife who was bedside, patient needed 3 people to stand him up at bedside because of the leg pain/weakness. Since transport and arrival to the ED, patient said he feels 70% better with pain and weakness. He said the R leg pain has mostly subsided and he feels much stronger in the leg as well. At bedside, was able to stand with only one assist which was a major improvement from before. Otherwise felt slightly lightheaded so did not attempt to walk him at this time. Denies weakness in other parts of the body, sensory changes or any other major complaints. Surgical site pain is in control.    PAST MEDICAL & SURGICAL HISTORY:  Hypertension    Prostate cancer    H/O mastoiditis    History of prostate biopsy    History of prostate surgery  Transperineal       Prostate Cryoablation    Status post tonsillectomy    Status post tonsillectomy    History of rectal abscess      Prostate Med (Nausea)    MEDICATIONS  (STANDING):  acetaminophen   IVPB .. 1000 milliGRAM(s) IV Intermittent once    MEDICATIONS  (PRN):      Objective    ICU Vital Signs Last 24 Hrs  T(C): 36.9 (19 Apr 2022 23:37), Max: 37.4 (19 Apr 2022 09:18)  T(F): 98.5 (19 Apr 2022 23:37), Max: 99.3 (19 Apr 2022 09:18)  HR: 93 (19 Apr 2022 23:37) (72 - 99)  BP: 112/67 (19 Apr 2022 23:37) (112/67 - 146/67)  BP(mean): --  ABP: --  ABP(mean): --  RR: 18 (19 Apr 2022 23:37) (15 - 19)  SpO2: 96% (19 Apr 2022 23:37) (93% - 99%)      PHYSICAL EXAM:    CONSTITUTIONAL: Well nourished, well developed, NON-DYSMORPHIC, and in no acute distress.    HEAD: normocephalic, atraumatic. CN intact. Mild weakness on R leg extension, but otherwise neuro exam intact.  EARS: L mastoid dressing in place with minimal serosang drainage.   ORAL CAVITY/OROPHARYNX: normal mucosa. No erythema, lesions or bleeding.  RESPIRATORY: Respirations unlabored, no increased work of breathing with use of accessory muscles and retractions. No stridor.  CARDIAC: Warm extremities, no cyanosis.       A/P: 71M who is presenting from [facility] after outpatient surgery for chronic ear infection in which he underwent a canal wall down mastoidectomy and meatoplasty on the RIGHT side. Patient in the postoperative period had difficulty walking because of R knee pain and R leg weakness. Per wife who was bedside, patient needed 3 people to stand him up at bedside because of the leg pain/weakness. Since transport and arrival to the ED, patient said he feels 70% better with pain and weakness. He said the R leg pain has mostly subsided and he feels much stronger in the leg as well. At bedside, was able to stand with only one assist which was a major improvement from before. Otherwise felt slightly lightheaded so did not attempt to walk him at this time.    - F/u LE doppler per ED  - If normal and able to walk, can discharge per ED  - If necessary to admit because he is unable to ambulate, ENT will continue following  - No NSAIDS for pain   - IV ancef while in-house if admitted ENT CONSULT NOTE    HPI: 71M who is presenting from [facility] after outpatient surgery for chronic ear infection in which he underwent a canal wall down mastoidectomy and meatoplasty on the LEFT side. Patient in the postoperative period had difficulty walking because of R knee pain and R leg weakness. Per wife who was bedside, patient needed 3 people to stand him up at bedside because of the leg pain/weakness. Since transport and arrival to the ED, patient said he feels 70% better with pain and weakness. He said the R leg pain has mostly subsided and he feels much stronger in the leg as well. At bedside, was able to stand with only one assist which was a major improvement from before. Otherwise felt slightly lightheaded so did not attempt to walk him at this time. Denies weakness in other parts of the body, sensory changes or any other major complaints. Surgical site pain is in control.    PAST MEDICAL & SURGICAL HISTORY:  Hypertension    Prostate cancer    H/O mastoiditis    History of prostate biopsy    History of prostate surgery  Transperineal       Prostate Cryoablation    Status post tonsillectomy    Status post tonsillectomy    History of rectal abscess      Prostate Med (Nausea)    MEDICATIONS  (STANDING):  acetaminophen   IVPB .. 1000 milliGRAM(s) IV Intermittent once    MEDICATIONS  (PRN):      Objective    ICU Vital Signs Last 24 Hrs  T(C): 36.9 (19 Apr 2022 23:37), Max: 37.4 (19 Apr 2022 09:18)  T(F): 98.5 (19 Apr 2022 23:37), Max: 99.3 (19 Apr 2022 09:18)  HR: 93 (19 Apr 2022 23:37) (72 - 99)  BP: 112/67 (19 Apr 2022 23:37) (112/67 - 146/67)  BP(mean): --  ABP: --  ABP(mean): --  RR: 18 (19 Apr 2022 23:37) (15 - 19)  SpO2: 96% (19 Apr 2022 23:37) (93% - 99%)      PHYSICAL EXAM:    CONSTITUTIONAL: Well nourished, well developed, NON-DYSMORPHIC, and in no acute distress.    HEAD: normocephalic, atraumatic. CN intact. Mild weakness on R leg extension, but otherwise neuro exam intact.  EARS: L mastoid dressing in place with minimal serosang drainage.   ORAL CAVITY/OROPHARYNX: normal mucosa. No erythema, lesions or bleeding.  RESPIRATORY: Respirations unlabored, no increased work of breathing with use of accessory muscles and retractions. No stridor.  CARDIAC: Warm extremities, no cyanosis.       A/P: 71M who is presenting from [facility] after outpatient surgery for chronic ear infection in which he underwent a canal wall down mastoidectomy and meatoplasty on the LEFT side. Patient in the postoperative period had difficulty walking because of R knee pain and R leg weakness. Per wife who was bedside, patient needed 3 people to stand him up at bedside because of the leg pain/weakness. Since transport and arrival to the ED, patient said he feels 70% better with pain and weakness. He said the R leg pain has mostly subsided and he feels much stronger in the leg as well. At bedside, was able to stand with only one assist which was a major improvement from before. Otherwise felt slightly lightheaded so did not attempt to walk him at this time.    - F/u LE doppler per ED  - If normal and able to walk, can discharge per ED  - If necessary to admit because he is unable to ambulate, ENT will continue following  - No NSAIDS for pain   - IV ancef while in-house if admitted

## 2022-04-20 NOTE — ED PROVIDER NOTE - CARE PROVIDER_API CALL
Vishal Villaseñor)  Otolaryngology  [address], [address]  [place], [place] [address]  Phone: [phone]  Fax: [phone]  Follow Up Time:

## 2022-04-20 NOTE — ED PROVIDER NOTE - PATIENT PORTAL LINK FT
You can access the FollowMyHealth Patient Portal offered by [facility] by registering at the following website: [email or web address]. By joining [facility]’s FollowMyHealth portal, you will also be able to view your health information using other applications (apps) compatible with our system.

## 2022-04-20 NOTE — ED PROVIDER NOTE - ENMT, MLM
Airway patent, Nasal mucosa clear. Mouth with normal mucosa. Throat has no vesicles, no oropharyngeal exudates and uvula is midline. Airway patent, Nasal mucosa clear. Mouth with normal mucosa.  Dressing to head s/p procedure, bleeding to R side of dressing chronic since surgery, no new or worsening bleeding.

## 2022-04-20 NOTE — ED PROVIDER NOTE - OBJECTIVE STATEMENT
72 y/o M pt with recent mastoidectomy done earlier toady by Dr. Gupta presents to ED c/o R leg and back pain. Pt post-surgery had delayed recovery with weakness when attempting to stand, felt shaky, and was unable to ambulate, so he was sent to ED for evaluation. Pt states since having onset of symptoms at 9PM, he had significant improvement of pain, but still feels unsteady and has slight leg pain with standing. In ED, pt is ao x 3, not in any distress, and well appearing.

## 2022-04-22 ENCOUNTER — NON-APPOINTMENT (OUTPATIENT)
Age: 72
End: 2022-04-22

## 2022-04-24 LAB
CULTURE RESULTS: NO GROWTH — SIGNIFICANT CHANGE UP
SPECIMEN SOURCE: SIGNIFICANT CHANGE UP

## 2022-04-25 ENCOUNTER — APPOINTMENT (OUTPATIENT)
Dept: OTOLARYNGOLOGY | Facility: CLINIC | Age: 72
End: 2022-04-25
Payer: MEDICARE

## 2022-04-25 VITALS
SYSTOLIC BLOOD PRESSURE: 135 MMHG | HEIGHT: 63 IN | HEART RATE: 68 BPM | TEMPERATURE: 98.2 F | BODY MASS INDEX: 31.01 KG/M2 | OXYGEN SATURATION: 98 % | DIASTOLIC BLOOD PRESSURE: 77 MMHG | WEIGHT: 175 LBS

## 2022-04-25 PROCEDURE — 99024 POSTOP FOLLOW-UP VISIT: CPT

## 2022-04-25 NOTE — ASSESSMENT
[FreeTextEntry1] : s/p 1 week L cwd mastoidectomy with L meatoplasty\par -pt is doing well\par -questions answered\par -mastoid cavity suctioned of clot\par -continue dep\par -no heavy lifting/ straining for another 2 weeks\par -given tie on masks to avoid pressure on postauricular surgical site\par RTC in 1 week or sooner as needed

## 2022-04-25 NOTE — PROCEDURE
[Same] : same as the Pre Op Dx. [] : Debridement of Mastoid [FreeTextEntry5] : L mastoid cavity debrided with suction atraumatically under microscope, TMs intact

## 2022-04-25 NOTE — PHYSICAL EXAM
[Normal] : no rashes [de-identified] : L postauricular incision clean, dry, intact [de-identified] : R nl, L mastoid cavity debrided atraumatically with suction under microscope, clot removed to posterior wall avoiding area of tm.  [de-identified] : gait steady

## 2022-04-27 LAB — SURGICAL PATHOLOGY STUDY: SIGNIFICANT CHANGE UP

## 2022-05-03 ENCOUNTER — APPOINTMENT (OUTPATIENT)
Dept: OTOLARYNGOLOGY | Facility: CLINIC | Age: 72
End: 2022-05-03
Payer: MEDICARE

## 2022-05-03 VITALS
WEIGHT: 175 LBS | OXYGEN SATURATION: 98 % | HEART RATE: 74 BPM | SYSTOLIC BLOOD PRESSURE: 156 MMHG | DIASTOLIC BLOOD PRESSURE: 75 MMHG | HEIGHT: 63 IN | TEMPERATURE: 98.7 F | BODY MASS INDEX: 31.01 KG/M2 | RESPIRATION RATE: 17 BRPM

## 2022-05-03 PROCEDURE — 99024 POSTOP FOLLOW-UP VISIT: CPT

## 2022-05-03 RX ORDER — OFLOXACIN OTIC 3 MG/ML
0.3 SOLUTION AURICULAR (OTIC) TWICE DAILY
Qty: 1 | Refills: 1 | Status: ACTIVE | COMMUNITY
Start: 2022-05-03 | End: 1900-01-01

## 2022-05-03 NOTE — PHYSICAL EXAM
[de-identified] : r nl; l mastoid cavity cleaned out under microsocpe - copious granulation suctioned, agno3 used. [Normal] : no masses and lesions seen, face is symmetric [de-identified] : facial n normal

## 2022-05-03 NOTE — HISTORY OF PRESENT ILLNESS
Edward Harris Patient Age: 59 year old  MESSAGE:   Betsy calling from [facility] requesting the order for the patient's procedure be snet over to her. Betsy states they need the order to provide financial assistance to the patient.  Please advise once available   Phone: [phone]  Fax: [phone]-Attention to ADRIANO Bell     WEIGHT AND HEIGHT:   Wt Readings from Last 1 Encounters:   No data found for Wt     Ht Readings from Last 1 Encounters:   No data found for Ht     BMI Readings from Last 1 Encounters:   No data found for BMI       ALLERGIES:  Patient has no allergy information on record.  No current outpatient medications on file.     No current facility-administered medications for this visit.     PHARMACY to use:           Pharmacy preference(s) on file: No Pharmacies Listed    CALL BACK INFO: Ok to leave response (including medical information) with family member or on answering machine  ROUTING: Patient's physician/staff        PCP: No primary care provider on file.         INS: No billing information found for this encounter.   PATIENT ADDRESS:  [address] [place] [id number]  
Faxed as requested  Confirmation received    Task completed  
[de-identified] : 2 week postop visit for this 70 yo M s/p l cwd mastoidectomy for cholesterol granuloma. He reports his hear has begun to pop and hearing occasionally improves. Still has drainage, but less. Here with his wife.

## 2022-05-19 ENCOUNTER — APPOINTMENT (OUTPATIENT)
Dept: OTOLARYNGOLOGY | Facility: CLINIC | Age: 72
End: 2022-05-19
Payer: MEDICARE

## 2022-05-19 VITALS
HEART RATE: 69 BPM | DIASTOLIC BLOOD PRESSURE: 84 MMHG | WEIGHT: 175 LBS | RESPIRATION RATE: 18 BRPM | BODY MASS INDEX: 31.01 KG/M2 | TEMPERATURE: 98 F | OXYGEN SATURATION: 98 % | SYSTOLIC BLOOD PRESSURE: 157 MMHG | HEIGHT: 63 IN

## 2022-05-19 PROCEDURE — 92557 COMPREHENSIVE HEARING TEST: CPT

## 2022-05-19 PROCEDURE — 99024 POSTOP FOLLOW-UP VISIT: CPT

## 2022-05-19 NOTE — DATA REVIEWED
[de-identified] :  showed R borderline nl hearing, L chl 5-10 DB improvement from 2022- results reviewed with pt

## 2022-05-19 NOTE — ASSESSMENT
[FreeTextEntry1] : s/p l cwd mastoidectomy \par -L mastoid cavity debrided\par -doing well \par - showed R borderline nl hearing, L chl 5-10 DB improvement from 2022- results reviewed with pt\par -reassured ok to fly\par -rops 2d\par RTC in 2 weeks \par -rec he goes to ED/ urgent care to have tick bite examined

## 2022-05-19 NOTE — HISTORY OF PRESENT ILLNESS
[de-identified] : 1 month postop visit for this 71 y/o M here with his wife s/p L cwd mastoidectomy for cholesterol granuloma. He feels hearing has improved on L. He c/o tick bite on r calf - I looked at it - very erythematous - he said he thought he got tick out- I could not tell; neither could pa - referred to [facility] (he lives nearby)

## 2022-05-19 NOTE — PROCEDURE
[Same] : same as the Pre Op Dx. [] : Debridement of Mastoid [FreeTextEntry5] : mastoid cavity debrided with suction atraumatically

## 2022-05-19 NOTE — PHYSICAL EXAM
[de-identified] : r nl ; L mastoid cavity debrided with suction atraumatically under microscope [Normal] : no nystagmus [de-identified] : gait steady

## 2022-05-25 NOTE — ASU DISCHARGE PLAN (ADULT/PEDIATRIC) - FOR NEXT 24 HOURS DO NOT:
Statement Selected Taltz Counseling: I discussed with the patient the risks of ixekizumab including but not limited to immunosuppression, serious infections, worsening of inflammatory bowel disease and drug reactions.  The patient understands that monitoring is required including a PPD at baseline and must alert us or the primary physician if symptoms of infection or other concerning signs are noted.

## 2022-06-01 ENCOUNTER — APPOINTMENT (OUTPATIENT)
Dept: OTOLARYNGOLOGY | Facility: CLINIC | Age: 72
End: 2022-06-01
Payer: MEDICARE

## 2022-06-01 VITALS
BODY MASS INDEX: 31.01 KG/M2 | TEMPERATURE: 97.7 F | SYSTOLIC BLOOD PRESSURE: 134 MMHG | HEIGHT: 63 IN | HEART RATE: 69 BPM | OXYGEN SATURATION: 97 % | WEIGHT: 175 LBS | DIASTOLIC BLOOD PRESSURE: 69 MMHG | RESPIRATION RATE: 18 BRPM

## 2022-06-01 PROCEDURE — 99024 POSTOP FOLLOW-UP VISIT: CPT

## 2022-06-01 RX ORDER — ERGOCALCIFEROL 1.25 MG/1
1.25 MG CAPSULE, LIQUID FILLED ORAL
Qty: 4 | Refills: 0 | Status: ACTIVE | COMMUNITY
Start: 2022-02-18

## 2022-06-01 RX ORDER — SULFAMETHOXAZOLE AND TRIMETHOPRIM 800; 160 MG/1; MG/1
800-160 TABLET ORAL TWICE DAILY
Qty: 20 | Refills: 0 | Status: ACTIVE | COMMUNITY
Start: 2022-06-01 | End: 1900-01-01

## 2022-06-01 RX ORDER — COVID-19 MOLECULAR TEST ASSAY
KIT MISCELLANEOUS
Qty: 4 | Refills: 0 | Status: ACTIVE | COMMUNITY
Start: 2022-05-17

## 2022-06-01 NOTE — PHYSICAL EXAM
[de-identified] : R nl; L mastoid cavity debrided with suction atraumatically under microscope, tm intact [Normal] : no nystagmus [de-identified] : gait steady

## 2022-06-01 NOTE — HISTORY OF PRESENT ILLNESS
[de-identified] : 6 wk postop visit for this 73 y/o M here with his wife s/p L cwd mastoidectomy for cholesterol granuloma. he reports his hearing has improved significantly but his ear continues to drain. The drainage is decreasing.

## 2022-06-01 NOTE — ASSESSMENT
[FreeTextEntry1] : s/p l cwd mastoidectomy\par -L mastoid cavity debrided ; less to debride but stil has whitish drainage\par -Bactrim- counseled on risk of sun sensitivity/rash and asked to stop taking immediately if this develops- pt understands \par RTC in 2 weeks or sooner as needed

## 2022-06-01 NOTE — PROCEDURE
[Same] : same as the Pre Op Dx. [] : Debridement of Mastoid [Other ___] : [unfilled] [FreeTextEntry5] : L mastoid cavity debrided with suction atraumatically

## 2022-06-30 ENCOUNTER — APPOINTMENT (OUTPATIENT)
Dept: OTOLARYNGOLOGY | Facility: CLINIC | Age: 72
End: 2022-06-30

## 2022-06-30 VITALS
HEIGHT: 63 IN | DIASTOLIC BLOOD PRESSURE: 79 MMHG | BODY MASS INDEX: 31.01 KG/M2 | TEMPERATURE: 98.4 F | HEART RATE: 76 BPM | SYSTOLIC BLOOD PRESSURE: 139 MMHG | OXYGEN SATURATION: 97 % | WEIGHT: 175 LBS | RESPIRATION RATE: 16 BRPM

## 2022-06-30 DIAGNOSIS — Z09 ENCOUNTER FOR FOLLOW-UP EXAMINATION AFTER COMPLETED TREATMENT FOR CONDITIONS OTHER THAN MALIGNANT NEOPLASM: ICD-10-CM

## 2022-06-30 PROCEDURE — 92557 COMPREHENSIVE HEARING TEST: CPT

## 2022-06-30 PROCEDURE — 92550 TYMPANOMETRY & REFLEX THRESH: CPT

## 2022-06-30 PROCEDURE — 99024 POSTOP FOLLOW-UP VISIT: CPT

## 2022-06-30 NOTE — PHYSICAL EXAM
[Normal] : no rashes [de-identified] : L mastoid cavity debrided with suction atraumatically mild dry matl- tm intact [FreeTextEntry2] : facial n intact [de-identified] : gait steady

## 2022-06-30 NOTE — PHYSICAL EXAM
[Normal] : no rashes [de-identified] : L mastoid cavity debrided with suction atraumatically mild dry matl- tm intact [FreeTextEntry2] : facial n intact [de-identified] : gait steady

## 2022-06-30 NOTE — ASSESSMENT
[FreeTextEntry1] : s/p l cwd mastoidectomy approximately 2.5 months ago \par -L mastoid cavity debrided\par -for postauricular skin irritation recommended switching masks to tie on masks- given supplies\par -recommended hydrocortisone cream BID x 5 d to postauricular wound if irritated\par doing well\par rtc 4 mo\par \par REJI Vanegas was scribe for this note; it was reviewed and modified as necessary by Vishal Villaseñor MD\par \par \par

## 2022-06-30 NOTE — PROCEDURE
[Same] : same as the Pre Op Dx. [] : Debridement of Mastoid [FreeTextEntry5] : L mastoid cavity debrided with suction atraumatically

## 2022-06-30 NOTE — HISTORY OF PRESENT ILLNESS
[de-identified] : 1 month followup for this 73 y/o M here with his wife s/p L cwd mastoidectomy for cholesterol granuloma approximately 2.5 months ago. At last visit his mastoid cavity was debrided. He completed course of Bactrim. He feels l hearing has improved, and has occasional l drainage. He is c/o skin irritation behind L auricle.

## 2022-06-30 NOTE — HISTORY OF PRESENT ILLNESS
[de-identified] : 1 month followup for this 71 y/o M here with his wife s/p L cwd mastoidectomy for cholesterol granuloma approximately 2.5 months ago. At last visit his mastoid cavity was debrided. He completed course of Bactrim. He feels l hearing has improved, and has occasional l drainage. He is c/o skin irritation behind L auricle.

## 2022-10-05 ENCOUNTER — APPOINTMENT (OUTPATIENT)
Dept: OTOLARYNGOLOGY | Facility: CLINIC | Age: 72
End: 2022-10-05

## 2024-04-23 NOTE — ED PROVIDER NOTE - MUSCULOSKELETAL, MLM
Spine appears normal, 5/5 motor strength bilaterally, pain with leg raise to R knee, no tenderness. Dressing to head s/p procedure, bleeding to R side of dressing chronic since surgery, no new or worsening bleeding. Yes Spine appears normal, 5/5 motor strength bilaterally, pain with leg raise to R knee, no tenderness.

## 2024-05-07 ENCOUNTER — APPOINTMENT (OUTPATIENT)
Dept: OTOLARYNGOLOGY | Facility: CLINIC | Age: 74
End: 2024-05-07
Payer: MEDICARE

## 2024-05-07 VITALS
HEART RATE: 71 BPM | OXYGEN SATURATION: 97 % | BODY MASS INDEX: 31.89 KG/M2 | HEIGHT: 63 IN | TEMPERATURE: 98.4 F | WEIGHT: 180 LBS | SYSTOLIC BLOOD PRESSURE: 134 MMHG | DIASTOLIC BLOOD PRESSURE: 81 MMHG

## 2024-05-07 DIAGNOSIS — H70.12 CHRONIC MASTOIDITIS, LEFT EAR: ICD-10-CM

## 2024-05-07 DIAGNOSIS — H90.A12 CONDUCTIVE HEARING LOSS, UNILATERAL, LEFT EAR WITH RESTRICTED HEARING ON THE CONTRALATERAL SIDE: ICD-10-CM

## 2024-05-07 DIAGNOSIS — H74.8X9 OTHER SPECIFIED DISORDERS OF MIDDLE EAR AND MASTOID, UNSPECIFIED EAR: ICD-10-CM

## 2024-05-07 PROCEDURE — 92567 TYMPANOMETRY: CPT

## 2024-05-07 PROCEDURE — 99213 OFFICE O/P EST LOW 20 MIN: CPT | Mod: 25

## 2024-05-07 PROCEDURE — 69220 CLEAN OUT MASTOID CAVITY: CPT | Mod: LT

## 2024-05-07 PROCEDURE — 92557 COMPREHENSIVE HEARING TEST: CPT

## 2024-05-08 ENCOUNTER — NON-APPOINTMENT (OUTPATIENT)
Age: 74
End: 2024-05-08

## 2024-05-08 ENCOUNTER — APPOINTMENT (OUTPATIENT)
Dept: OTOLARYNGOLOGY | Facility: CLINIC | Age: 74
End: 2024-05-08

## 2024-05-15 ENCOUNTER — APPOINTMENT (OUTPATIENT)
Dept: OTOLARYNGOLOGY | Facility: CLINIC | Age: 74
End: 2024-05-15

## (undated) DEVICE — CATH IV SAFE ACUVANCE 14G X 2" (ORANGE)

## (undated) DEVICE — PACK TYMPANOMASTOIDECTOMY LF

## (undated) DEVICE — BLADE TYMPANOPLASTY 2.5MM W 60 DEGREE BEVEL DOWN

## (undated) DEVICE — GOWN XXL

## (undated) DEVICE — DRAPE TOWEL 1000 SMALL 17" X 11"

## (undated) DEVICE — ELCTR NDL SUBDERMAL 2 CHANNEL

## (undated) DEVICE — SOL IRR BAG NS 0.9% 3000ML

## (undated) DEVICE — BUR ANSPACH BALL FLUTED 7MM

## (undated) DEVICE — DRAPE NEUROLOGICAL

## (undated) DEVICE — NDL BLD BEAVER CUTTING EDGE 3.0MM

## (undated) DEVICE — DRSG TEGADERM 2.5X3"

## (undated) DEVICE — WARMING BLANKET LOWER ADULT

## (undated) DEVICE — GLV 7.5 PROTEXIS (WHITE)

## (undated) DEVICE — DRILL BIT ANSPACH FLUTED BALL 4MM X 5CM

## (undated) DEVICE — DURABLE MEDICAL EQUIPMENT: Type: DURABLE MEDICAL EQUIPMENT

## (undated) DEVICE — DRAPE VARI-LENS2 MICROSCPOPE 68MM

## (undated) DEVICE — DRILL BIT ANSPACH DIAMOND BALL 4MM X 25.4MM

## (undated) DEVICE — Device

## (undated) DEVICE — DRSG MASTISOL

## (undated) DEVICE — DRILL BIT ANSPACH FLUTED ACORN 5MMX25.4MM

## (undated) DEVICE — BUR ANSPACH BALL FLUTED EXT 2MM

## (undated) DEVICE — SLV COMPRESSION KNEE MED

## (undated) DEVICE — BUR ANSPACH BALL FLUTED EXT 3MM